# Patient Record
Sex: FEMALE | Race: WHITE | Employment: FULL TIME | URBAN - METROPOLITAN AREA
[De-identification: names, ages, dates, MRNs, and addresses within clinical notes are randomized per-mention and may not be internally consistent; named-entity substitution may affect disease eponyms.]

---

## 2017-02-01 RX ORDER — VALACYCLOVIR HYDROCHLORIDE 1 G/1
TABLET, FILM COATED ORAL
Qty: 20 TAB | Refills: 0 | Status: SHIPPED | OUTPATIENT
Start: 2017-02-01

## 2017-02-01 NOTE — TELEPHONE ENCOUNTER
Last office visit 8/22/16  No upcomming appointments on file.  Contacted patient to schedule follow up however she states that she will need to call back at a later time to make an appointment because she is driving

## 2017-03-02 NOTE — TELEPHONE ENCOUNTER
Last office visit - 08.22.16  Next office visit - 03.13.17  Last Refill - 8.25.16    Requested Prescriptions     Pending Prescriptions Disp Refills    rizatriptan (MAXALT) 10 mg tablet 9 Tab 1     Sig: TAKE 1 TABLET BY MOUTH ONCE AS NEEDED FOR MIGRAINE. MAY REPEAT 1 TIME IN 2 HOURS IF NEEDED

## 2017-03-03 RX ORDER — RIZATRIPTAN BENZOATE 10 MG/1
TABLET ORAL
Qty: 9 TAB | Refills: 1 | Status: SHIPPED | OUTPATIENT
Start: 2017-03-03

## 2017-03-10 ENCOUNTER — OFFICE VISIT (OUTPATIENT)
Dept: INTERNAL MEDICINE CLINIC | Age: 47
End: 2017-03-10

## 2017-03-10 VITALS
HEIGHT: 61 IN | OXYGEN SATURATION: 98 % | BODY MASS INDEX: 29.07 KG/M2 | SYSTOLIC BLOOD PRESSURE: 126 MMHG | WEIGHT: 154 LBS | DIASTOLIC BLOOD PRESSURE: 78 MMHG | RESPIRATION RATE: 16 BRPM | TEMPERATURE: 96.7 F | HEART RATE: 76 BPM

## 2017-03-10 DIAGNOSIS — Z79.899 ENCOUNTER FOR LONG-TERM CURRENT USE OF MEDICATION: Primary | ICD-10-CM

## 2017-03-10 DIAGNOSIS — Z86.69 HX OF MIGRAINE HEADACHES: ICD-10-CM

## 2017-03-10 DIAGNOSIS — Z00.00 ROUTINE ADULT HEALTH MAINTENANCE: ICD-10-CM

## 2017-03-10 PROBLEM — H04.123 DRY EYES, BILATERAL: Status: ACTIVE | Noted: 2017-03-10

## 2017-03-10 RX ORDER — CHOLECALCIFEROL (VITAMIN D3) 125 MCG
CAPSULE ORAL
COMMUNITY

## 2017-03-10 NOTE — PROGRESS NOTES
Chief Complaint   Patient presents with    Migraine    Medication Evaluation       1. Have you been to the ER, urgent care clinic since your last visit? Hospitalized since your last visit? No    2. Have you seen or consulted any other health care providers outside of the [address] [place] [address] since your last visit? Include any pap smears or colon screening.  No

## 2017-03-10 NOTE — MR AVS SNAPSHOT
Visit Information Date & Time Provider Department Dept. Phone Encounter #  
 3/10/2017 12:00 PM [facility] [id number] Follow-up Instructions Return in about 6 months (around 9/10/2017) for physical.  
 Follow-up and Disposition History Upcoming Health Maintenance Date Due INFLUENZA AGE 9 TO ADULT 8/1/2016 PAP AKA CERVICAL CYTOLOGY 8/29/2017 DTaP/Tdap/Td series (2 - Td) 7/28/2025 Allergies as of 3/10/2017  Review Complete On: 3/10/2017 By: Mellissa Palomino NP Severity Noted Reaction Type Reactions Latex  10/28/2011    Swelling Face and body Imitrex [Sumatriptan Succinate]  10/05/2011    Nausea and Vomiting Current Immunizations  Reviewed on 12/18/2014 Name Date Influenza Vaccine 10/15/2014 Tdap 7/28/2015 Not reviewed this visit You Were Diagnosed With   
  
 Codes Comments Encounter for long-term current use of medication    -  Primary ICD-10-CM: M10.442 ICD-9-CM: V58.69 Hx of migraine headaches     ICD-10-CM: Z86.69 
ICD-9-CM: V12.49 Routine adult health maintenance     ICD-10-CM: Z00.00 ICD-9-CM: V70.0 Vitals BP Pulse Temp Resp Height(growth percentile) Weight(growth percentile) 126/78 (BP 1 Location: Left arm, BP Patient Position: Sitting) 76 96.7 °F (35.9 °C) (Oral) 16 5' 1\" (1.549 m) 154 lb (69.9 kg) SpO2 BMI OB Status Smoking Status 98% 29.1 kg/m2 IUD Current Every Day Smoker BMI and BSA Data Body Mass Index Body Surface Area  
 29.1 kg/m 2 1.73 m 2 Preferred Pharmacy Pharmacy Name Phone [id number] - [place] - [address] [phone] Your Updated Medication List  
  
   
This list is accurate as of: 3/10/17  1:53 PM.  Always use your most recent med list.  
  
  
  
  
 ALPRAZolam 0.5 mg tablet Commonly known as:  XANAX  
TAKE 1 AND 1/2 TABLETS BY MOUTH AT BEDTIME FOR SLEEP  
  
 aspirin 81 mg chewable tablet Take 1 Tab by mouth daily. escitalopram oxalate 20 mg tablet Commonly known as:  Shay Licha TAKE 1 TABLET BY MOUTH DAILY. FIORICET PO Take  by mouth. One (1) to two (2) by mouth every 4-6 hours as needed FLONASE 50 mcg/actuation nasal spray Generic drug:  fluticasone 2 Sprays by Both Nostrils route nightly. MIRENA 20 mcg/24 hr (5 years) IUD Generic drug:  levonorgestrel 1 Each by IntraUTERine route once. rizatriptan 10 mg tablet Commonly known as:  Annie Moose TAKE 1 TABLET BY MOUTH ONCE AS NEEDED FOR MIGRAINE. MAY REPEAT 1 TIME IN 2 HOURS IF NEEDED  
  
 traZODone 50 mg tablet Commonly known as:  DESYREL  
TAKE 1 TABLET BY MOUTH EACH NIGHT AT BEDTIME  
  
 valACYclovir 1 gram tablet Commonly known as:  VALTREX  
TAKE 2 TABLETS BY MOUTH EVERY 12 HOURS FOR 1 DAY AS NEEDED FOR COLD SORE  
  
 VITAMIN D3 2,000 unit Tab Generic drug:  cholecalciferol (vitamin D3) Take  by mouth. We Performed the Following CBC WITH AUTOMATED DIFF [88446 CPT(R)] LIPID PANEL [83578 CPT(R)] METABOLIC PANEL, COMPREHENSIVE [11282 CPT(R)] Follow-up Instructions Return in about 6 months (around 9/10/2017) for physical.  
  
  
Introducing [facility]! Dear Greg Dawn: 
Thank you for requesting a Written account. Our records indicate that you already have an active Written account. You can access your account anytime at [email or web address] Did you know that you can access your hospital and ER discharge instructions at any time in Written? You can also review all of your test results from your hospital stay or ER visit. Additional Information If you have questions, please visit the Frequently Asked Questions section of the Written website at [email or web address] Remember, Written is NOT to be used for urgent needs. For medical emergencies, dial 911. Now available from your iPhone and Android! Please provide this summary of care documentation to your next provider. Your primary care clinician is listed as Anya Gutierrez. If you have any questions after today's visit, please call [phone].

## 2018-09-25 ENCOUNTER — HOSPITAL ENCOUNTER (OUTPATIENT)
Dept: MAMMOGRAPHY | Age: 48
Discharge: HOME | End: 2018-09-25
Payer: COMMERCIAL

## 2018-09-25 DIAGNOSIS — Z12.39 BREAST CANCER SCREENING: ICD-10-CM

## 2018-09-25 PROCEDURE — 77067 SCR MAMMO BI INCL CAD: CPT

## 2021-08-20 NOTE — PROGRESS NOTES
41 yo female is in for med check and 6 mo f/u. She continues to see her mental health provider and some of her medications. She up to date with GYN care and mammograms. Dental care is up to date. Dermatology is annual for general skin check. Bowel fx is good. No regular exercise regimen. She is happier now with her job change. Sleep is aided by Trazodone and Xanax. She never had lab work ordered by Dr. López Monzon last August done. She has been having some eye problems and seeing Dr. Gaurav Hinds, Ophthalmologist.    PE: Overweight WF   BP - 126/78   Heart - RRR   Lungs - clear   Abd - no M,T,O   LEs - no edema; distal pulses - present    Imp: Encounter for medication follow up   Hx Migraine HAs     Plan: Order for labs not done from August   See Dr. López Monzon in 6 mos  ________________________  Expected course of current diagnosed problem(s) as well as expected progression and possible complications, and desired follow up with provider are discussed with patient. Patient is encouraged to be back in touch with any questions or concerns. Patient expresses understanding of plan of care. Patient is given AVS which includes diagnoses, current medications, vitals. Needs IV antibiotic agitation by specialty pharmacy